# Patient Record
Sex: MALE | Race: WHITE | Employment: FULL TIME | URBAN - METROPOLITAN AREA
[De-identification: names, ages, dates, MRNs, and addresses within clinical notes are randomized per-mention and may not be internally consistent; named-entity substitution may affect disease eponyms.]

---

## 2018-01-18 ENCOUNTER — TRANSFERRED RECORDS (OUTPATIENT)
Dept: HEALTH INFORMATION MANAGEMENT | Facility: CLINIC | Age: 23
End: 2018-01-18

## 2018-01-19 ENCOUNTER — TRANSFERRED RECORDS (OUTPATIENT)
Dept: HEALTH INFORMATION MANAGEMENT | Facility: CLINIC | Age: 23
End: 2018-01-19

## 2019-03-06 ENCOUNTER — OFFICE VISIT (OUTPATIENT)
Dept: FAMILY MEDICINE | Facility: CLINIC | Age: 24
End: 2019-03-06
Payer: COMMERCIAL

## 2019-03-06 VITALS
WEIGHT: 164 LBS | HEIGHT: 75 IN | TEMPERATURE: 97.8 F | BODY MASS INDEX: 20.39 KG/M2 | SYSTOLIC BLOOD PRESSURE: 109 MMHG | RESPIRATION RATE: 18 BRPM | HEART RATE: 69 BPM | DIASTOLIC BLOOD PRESSURE: 71 MMHG | OXYGEN SATURATION: 98 %

## 2019-03-06 DIAGNOSIS — M65.4 DE QUERVAIN'S DISEASE (TENOSYNOVITIS): ICD-10-CM

## 2019-03-06 DIAGNOSIS — Z83.719 FAMILY HISTORY OF COLONIC POLYPS: ICD-10-CM

## 2019-03-06 DIAGNOSIS — Z76.89 ENCOUNTER TO ESTABLISH CARE: Primary | ICD-10-CM

## 2019-03-06 DIAGNOSIS — H83.09 VIRAL LABYRINTHITIS, UNSPECIFIED LATERALITY: ICD-10-CM

## 2019-03-06 DIAGNOSIS — Z11.3 ROUTINE SCREENING FOR STI (SEXUALLY TRANSMITTED INFECTION): ICD-10-CM

## 2019-03-06 ASSESSMENT — MIFFLIN-ST. JEOR: SCORE: 1824.53

## 2019-03-06 NOTE — PATIENT INSTRUCTIONS
Here is the plan from today's visit    1. Viral labyrinthitis, unspecified laterality  This will likely resolve on its own over time    2. De Quervain's disease (tenosynovitis)  Avoid chronic use of the thumb    3. Family history of colonic polyps  - GASTROENTEROLOGY ADULT REF PROCEDURE ONLY    Follow up plan  Follow up with colonoscopy     Thank you for coming to [facility] today.  Lab Testing:  **If you had lab testing today and your results are reassuring or normal they will be mailed to you or sent through [employer] within 7 days.   **If the lab tests need quick action we will call you with the results.  The phone number we will call with results is # [phone] (home) . If this is not the best number please call our clinic and change the number.  Medication Refills:  If you need any refills please call your pharmacy and they will contact us.   If you need to  your refill at a new pharmacy, please contact the new pharmacy directly. The new pharmacy will help you get your medications transferred faster.   Scheduling:  If you have any concerns about today's visit or wish to schedule another appointment please call our office during normal business hours [phone] (8-5:00 M-F)  If a referral was made to a [facility] and you don't get a call from central scheduling please call [phone].  If a Mammogram was ordered for you at [facility] call [phone] to schedule or change your appointment.  If you had an XRay/CT/Ultrasound/MRI ordered the number is [phone] to schedule or change your radiology appointment.   Medical Concerns:  If you have urgent medical concerns please call [phone] at any time of the day.    Shaun Murguia MD

## 2019-03-06 NOTE — PROGRESS NOTES
"      HPI:       Julien Argueta is a 23 year old new patient who presents for the following and to establish care:      Dizziness/Vertigo  Onset:  Off and on for last 3-4 weeks   What brings it on?  Was sitting working on computer and sitting on couch.  Comes on suddenly and resolved over approximately 5 minutes.    Description:   Do you feel like you are going to faint?:  No   Does it feel like the surroundings (bed, room) are moving?:  YES - \"on a ship\"  Have you felt unsteady/off balance?:  YES   Have you passed out or fallen?: No     Intensity: mild    Progression of Symptoms:  same and intermittent    Accompanying Signs & Symptoms:  Heart palpitations:No   Nausea, vomiting:No   Weakness in arms or legs: No   Fatigue: No   Vision or speech changes: No   Ringing in ears (Tinnitus):No   Hearing Loss: No     History:   Head trauma/concussion hx:No   Previous similar symptoms: No   Recent bleeding history: No     Precipitating factors:   Worse with activity or head movement?: No   Any new medications (BP?):No   Alcohol/drug abuse/withdrawal: No     Alleviating factors:   Does staying in a fixed position give relief?:no - resolves on own quickly   Therapies Tried and outcome: Nothing    Joint/Muscle Pain      Onset:  Few weeks ago    Injury?  no    Description:   Location(s):  Right thumb (right handed)  Character: Dull ache -     Intensity: mild    Progression of Symptoms: same    Accompanying Signs & Symptoms:  Other symptoms: no radiation of pain, numbness, tingling, weakness, warmth, swelling, redness, or discoloration    History:   Previous similar pain: no      Worsened by    Overuse?: Yes Details: - computer work  Morning Stiffness?:no    Alleviating factors:  Improved by: rest/inactivity  Therapies Tried and outcome: Nothing    Patient requests STI screening.  No symptoms.  Has unprotected intercourse with one female partner.  Has not been tested since being with this partner.    Patient's father and PGF with " history of early onset premalignant colon polyps.  He wonders how this affects him.  Denies melena or hematochezia.  No fever,chills, or sweats.  No weight loss.    Problem, Medication and Allergy Lists were    There are no active problems to display for this patient.        No current outpatient medications on file.       No Known Allergies.    Patient is   a new patient to this clinic and so  I reviewed/updated the Past Medical History, the Family History and the Social History. ,   Past Medical History:   Diagnosis Date     Murmur     Found as a teen.  Normal Echo   ,   Family History     Problem (# of Occurrences) Relation (Name,Age of Onset)    Atrial fibrillation (1) Maternal Grandmother    Cancer (1) Paternal Grandfather: prostate    Colon Polyps (2) Father: Precancerous, Paternal Grandfather: Precancerous    Heart Disease (1) Maternal Grandfather       and   Social History     Socioeconomic History     Marital status: Single     Spouse name: None     Number of children: None     Years of education: None     Highest education level: None   Occupational History     Occupation:    Social Needs     Financial resource strain: None     Food insecurity:     Worry: None     Inability: None     Transportation needs:     Medical: None     Non-medical: None   Tobacco Use     Smoking status: Never Smoker     Smokeless tobacco: Never Used   Substance and Sexual Activity     Alcohol use: Yes     Comment: one to two drinks per week     Drug use: No     Sexual activity: Yes     Partners: Female     Birth control/protection: IUD   Lifestyle     Physical activity:     Days per week: None     Minutes per session: None     Stress: None   Relationships     Social connections:     Talks on phone: None     Gets together: None     Attends Spiritism service: None     Active member of club or organization: None     Attends meetings of clubs or organizations: None     Relationship status: None     Intimate partner  "violence:     Fear of current or ex partner: None     Emotionally abused: None     Physically abused: None     Forced sexual activity: None   Other Topics Concern     None   Social History Narrative     None   .         Review of Systems:   CONSTITUTIONAL: no fatigue, no unexpected change in weight  SKIN: no worrisome rashes, no worrisome moles, no worrisome lesions  EYES: no acute vision problems or changes  ENT: no ear problems, no mouth problems, no throat problems  RESP: no significant cough, no shortness of breath  CV: no chest pain, no palpitations, no new or worsening peripheral edema  GI: no nausea, no vomiting, no constipation, no diarrhea  : no frequency, no dysuria, no hematuria  MUSCULOSKELETAL: as above   NEURO:  As above   ENDOCRINE: no temperature intolerance, no skin/hair changes  HEME: no easy bruising, no bleeding problems  PSYCHIATRIC: NEGATIVE for changes in mood or affect         Physical Exam:     Vitals:    03/06/19 1554   BP: 109/71   Pulse: 69   Resp: 18   Temp: 97.8  F (36.6  C)   TempSrc: Oral   SpO2: 98%   Weight: 74.4 kg (164 lb)   Height: 1.905 m (6' 3\")     Body mass index is 20.5 kg/m .  Vitals were reviewed and were normal  GENERAL: healthy, alert and no distress  EYES: Eyes grossly normal to inspection, extraocular movements - intact, and PERRL  HENT: ear canals- normal; TMs- normal; Nose- normal; Mouth- no ulcers, no lesions  NECK: no tenderness, no adenopathy, no asymmetry, no masses, no stiffness; thyroid- normal to palpation  RESP: lungs clear to auscultation - no rales, no rhonchi, no wheezes  CV: regular rates and rhythm, normal S1 S2, no S3 or S4 and no murmur, no click or rub -  ABDOMEN: soft, no tenderness, no  hepatosplenomegaly, no masses, normal bowel sounds  MS: extremities- no gross deformities noted, no edema  Right hand  Hand/Finger Exam: Inspection:All Normal  Tender: All Normal  Non-tender: All Normal  Range of Motion All Normal  Strength: full strength  Special " tests: positive Finklestein's  SKIN: no suspicious lesions, no rashes  NEURO: strength and tone- normal, sensory exam- grossly normal, mentation- intact, speech- normal, reflexes- symmetric  BACK: no CVA tenderness, no paralumbar tenderness  PSYCH: Alert and oriented times 3; coherent speech, normal rate and volume, able to articulate logical thoughts, able to abstract reason, no tangential thoughts, no hallucinations or delusions. Affect is normal.  LYMPHATICS: normal ant/post cervical, supraclavicular nodes      Results:     Pending     Assessment and Plan     1. Encounter to establish care  RAYRAY for past records signed.    2. Viral labyrinthitis, unspecified laterality  Explained to patient that for many people this type of dizziness is self-limited over a period of days or weeks.  Antivert may help but does not treat the problem itself.  Pt declines tx at this time.  Canalith repositioning can solve this problem often in one visit.  A referral to physical therapy can be pursued if necessary.  If the dizziness persists a referral to ENT can be pursued.    3. De Quervain's disease (tenosynovitis)  Discussed natural history and expected course of condition.  Follow up if not responding as anticipated.  Consider splinting at that time.    4. Family history of colonic polyps  Referral for colonoscopy for concern for García syndrome/familial polyposis  - GASTROENTEROLOGY ADULT REF PROCEDURE ONLY    5. Routine screening for STI (sexually transmitted infection)  Follow up based on results.  Will mail if negative; call if positive   - HIV Antigen Antibody Combo  - Treponema Abs w Reflex to RPR and Titer  - NEISSERIA GONORRHOEA PCR  - CHLAMYDIA TRACHOMATIS PCR    Options for treatment and follow-up care were reviewed with the patient. Julien Argueta  engaged in the decision making process and verbalized understanding of the options discussed and agreed with the final plan.    Shaun Murguia MD

## 2019-03-06 NOTE — LETTER
March 7, 2019    Julien Argueta  [address] [facility] [address]    Dear Julien,    Thank you for getting your care at [facility]. Please see below for your test results.    Resulted Orders   HIV Antigen Antibody Combo   Result Value Ref Range    HIV Antigen Antibody Combo Nonreactive NR^Nonreactive          Comment:      HIV-1 p24 Ag & HIV-1/HIV-2 Ab Not Detected   Treponema Abs w Reflex to RPR and Titer   Result Value Ref Range    Treponema Antibodies Nonreactive NR^Nonreactive   NEISSERIA GONORRHOEA PCR   Result Value Ref Range    Specimen Descrip Urine     N Gonorrhea PCR Negative NEG^Negative      Comment:      Negative for N. gonorrhoeae rRNA by transcription mediated amplification.  A negative result by transcription mediated amplification does not preclude   the presence of N. gonorrhoeae infection because results are dependent on   proper and adequate collection, absence of inhibitors, and sufficient rRNA to   be detected.     CHLAMYDIA TRACHOMATIS PCR   Result Value Ref Range    Specimen Description Urine     Chlamydia Trachomatis PCR Negative NEG^Negative      Comment:      Negative for C. trachomatis rRNA by transcription mediated amplification.  A negative result by transcription mediated amplification does not preclude   the presence of C. trachomatis infection because results are dependent on   proper and adequate collection, absence of inhibitors, and sufficient rRNA to   be detected.         Your HIV, syphilis, gonorrhea, and chlamydia tests are all negative for infection.    Sincerely,    Shaun Murguia MD

## 2019-03-07 LAB
C TRACH DNA SPEC QL NAA+PROBE: NEGATIVE
HIV 1+2 AB+HIV1 P24 AG SERPL QL IA: NONREACTIVE
N GONORRHOEA DNA SPEC QL NAA+PROBE: NEGATIVE
SPECIMEN SOURCE: NORMAL
SPECIMEN SOURCE: NORMAL
T PALLIDUM AB SER QL: NONREACTIVE

## 2019-04-25 ENCOUNTER — TELEPHONE (OUTPATIENT)
Dept: GASTROENTEROLOGY | Facility: CLINIC | Age: 24
End: 2019-04-25

## 2019-04-25 DIAGNOSIS — Z12.11 SPECIAL SCREENING FOR MALIGNANT NEOPLASMS, COLON: Primary | ICD-10-CM

## 2019-04-25 NOTE — TELEPHONE ENCOUNTER
VM with request pt contact Endoscopy Pre-assessment RN to review upcoming procedure information.  Telephone call-back number provided.    Isabela Irwin, RN  [facility]    Additional Information regarding appointment:      Patient scheduled for:   [x] Colonoscopy    Indication for procedure.    [x] family history of colonic polyps    Procedure Provider:  Dr. Perez      Referring Provider. Shaun Murguia    Arrival time verified: 1400    Facility location verified:   [][address], [address]st Floor, [address]  [x][address] [facility], 5th floor

## 2019-04-25 NOTE — TELEPHONE ENCOUNTER
Patient call back    Arrival time verified? 5/01/19 @1400    Facility location verified? [address]; 5th floor    Instructions given regarding prep and procedure    Prep Type Golytely    Are you taking any anticoagulants or blood thinners? no    Instructions given?     Electronic implanted devices? no    Pre procedure teaching completed? Yes    Transportation from procedure? Yes, verbalized understanding of expectations of designated  and  6 hours after procedure    H&P / Pre op physical completed? n/a

## 2019-04-30 ENCOUNTER — TELEPHONE (OUTPATIENT)
Dept: GASTROENTEROLOGY | Facility: CLINIC | Age: 24
End: 2019-04-30

## 2019-04-30 NOTE — TELEPHONE ENCOUNTER
Call placed to patient to alert him there is availability to come in earlier for scheduled colonoscopy on Wedesday May 1st with Dr. Perez. Patient declined offer to come in an hour earlier  Isabela Irwin

## 2019-05-01 ENCOUNTER — HOSPITAL ENCOUNTER (OUTPATIENT)
Facility: AMBULATORY SURGERY CENTER | Age: 24
End: 2019-05-01
Attending: INTERNAL MEDICINE
Payer: COMMERCIAL

## 2019-05-01 VITALS
WEIGHT: 170 LBS | SYSTOLIC BLOOD PRESSURE: 112 MMHG | HEIGHT: 76 IN | BODY MASS INDEX: 20.7 KG/M2 | RESPIRATION RATE: 16 BRPM | TEMPERATURE: 97.8 F | OXYGEN SATURATION: 100 % | HEART RATE: 51 BPM | DIASTOLIC BLOOD PRESSURE: 66 MMHG

## 2019-05-01 LAB — COLONOSCOPY: NORMAL

## 2019-05-01 RX ORDER — LIDOCAINE 40 MG/G
CREAM TOPICAL
Status: DISCONTINUED | OUTPATIENT
Start: 2019-05-01 | End: 2019-05-02 | Stop reason: HOSPADM

## 2019-05-01 RX ORDER — SIMETHICONE
LIQUID (ML) MISCELLANEOUS PRN
Status: DISCONTINUED | OUTPATIENT
Start: 2019-05-01 | End: 2019-05-01 | Stop reason: HOSPADM

## 2019-05-01 RX ORDER — ONDANSETRON 2 MG/ML
4 INJECTION INTRAMUSCULAR; INTRAVENOUS
Status: DISCONTINUED | OUTPATIENT
Start: 2019-05-01 | End: 2019-05-02 | Stop reason: HOSPADM

## 2019-05-01 RX ORDER — FENTANYL CITRATE 50 UG/ML
INJECTION, SOLUTION INTRAMUSCULAR; INTRAVENOUS PRN
Status: DISCONTINUED | OUTPATIENT
Start: 2019-05-01 | End: 2019-05-01 | Stop reason: HOSPADM

## 2019-05-01 ASSESSMENT — MIFFLIN-ST. JEOR: SCORE: 1867.61

## 2019-05-01 NOTE — DISCHARGE INSTRUCTIONS
Discharge Instructions after Colonoscopy  or Sigmoidoscopy    Today you had a __x__ Colonoscopy ____ Sigmoidoscopy    Activity and Diet  You were given medicine for pain. You may be dizzy or sleepy.  For 24 hours:    Do not drive or use heavy equipment.    Do not make important decisions.    Do not drink any alcohol.  You may return to your normal diet and medicines.    Discomfort    Air was placed in your colon during the exam in order to see it. Walking helps to pass the air.    You may take Tylenol (acetaminophen) for pain unless your doctor has told you not to.  Do not take aspirin or ibuprofen (Advil, Motrin, or other anti-inflammatory  drugs) for _____ days.    Follow-up  __x__ We took small tissue samples or polyps to study. Your doctor will call you with the results  within two weeks.    When to call:    Call right away if you have:    Unusual pain in belly or chest pain not relieved with passing air.    More than 1 to 2 Tablespoons of bleeding from your rectum.    Fever above 100.6  F (37.5  C).    If you have severe pain, bleeding, or shortness of breath, go to an emergency room.    If you have questions, call:  Monday to Friday, 7 a.m. to 4:30 p.m.  Endoscopy: [phone] (We may have to call you back)    After hours  Hospital: [phone] (Ask for the GI fellow on call)

## 2019-05-03 LAB — COPATH REPORT: NORMAL

## 2019-05-17 ENCOUNTER — OFFICE VISIT (OUTPATIENT)
Dept: FAMILY MEDICINE | Facility: CLINIC | Age: 24
End: 2019-05-17
Payer: COMMERCIAL

## 2019-05-17 VITALS
TEMPERATURE: 97.4 F | HEIGHT: 75 IN | HEART RATE: 86 BPM | WEIGHT: 160 LBS | RESPIRATION RATE: 20 BRPM | SYSTOLIC BLOOD PRESSURE: 106 MMHG | DIASTOLIC BLOOD PRESSURE: 73 MMHG | BODY MASS INDEX: 19.89 KG/M2 | OXYGEN SATURATION: 96 %
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
## 2019-05-17 ASSESSMENT — MIFFLIN-ST. JEOR: SCORE: 1806.39

## 2019-05-17 NOTE — PROGRESS NOTES
Preceptor Attestation:   Patient seen, evaluated and discussed with the resident.   I have verified the content of the note, which accurately reflects my assessment of the patient and the plan of care.   Supervising Physician:  Shaun Murguia MD
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
## 2019-12-03 ENCOUNTER — OFFICE VISIT (OUTPATIENT)
Dept: FAMILY MEDICINE | Facility: CLINIC | Age: 24
End: 2019-12-03
Payer: COMMERCIAL

## 2019-12-03 VITALS
DIASTOLIC BLOOD PRESSURE: 81 MMHG | OXYGEN SATURATION: 96 % | HEIGHT: 75 IN | SYSTOLIC BLOOD PRESSURE: 121 MMHG | HEART RATE: 78 BPM | BODY MASS INDEX: 20.76 KG/M2 | WEIGHT: 167 LBS

## 2019-12-03 DIAGNOSIS — M54.50 CHRONIC BILATERAL LOW BACK PAIN WITHOUT SCIATICA: ICD-10-CM

## 2019-12-03 DIAGNOSIS — Z23 ENCOUNTER FOR IMMUNIZATION: Primary | ICD-10-CM

## 2019-12-03 DIAGNOSIS — G89.29 CHRONIC BILATERAL LOW BACK PAIN WITHOUT SCIATICA: ICD-10-CM

## 2019-12-03 ASSESSMENT — ENCOUNTER SYMPTOMS
FATIGUE: 0
NECK PAIN: 0
FEVER: 0
WHEEZING: 0
DIZZINESS: 0
HEMATURIA: 0
SHORTNESS OF BREATH: 0
DIFFICULTY URINATING: 0
UNEXPECTED WEIGHT CHANGE: 0
NUMBNESS: 0
ABDOMINAL PAIN: 0
HEADACHES: 0
BACK PAIN: 1
CHILLS: 0
WEAKNESS: 0
PALPITATIONS: 0
COUGH: 0

## 2019-12-03 ASSESSMENT — MIFFLIN-ST. JEOR: SCORE: 1833.14

## 2019-12-03 NOTE — PROGRESS NOTES
HPI       Julien Argueta is a 24 year old  who presents for   Chief Complaint   Patient presents with     Pain     back x2 weeks getting worse     Has been happening for years. Noticed it getting worse this past month after taking a long trip that involved lots of walking. Mid-low back pain bilaterally.     Back Pain      Duration: daily        Specific cause: none, maybe sitting at work    Description:   Location of pain: mid-low back, on either side of spine  Character of pain: dull ache  Pain radiation:none  New numbness or weakness in legs, not attributed to pain:  No   Any new bowel or bladder incontinence?No     Intensity: Currently 2/10, At its worst 5/10    History:   Pain interferes with job/home/school: No   History of back problems: no prior back problems-was told he had scoliosis as a kid  Any previous MRI or X-rays: None  Sees a specialist for back pain:  No  Therapies tried without relief: tylenol, stretching provide mild relief    Alleviating factors:   Improved by: acetaminophen (Tylenol) and stretch      Precipitating factors:  Worsened by: walking, sleeping (worse when he wakes up)    Accompanying Signs & Symptoms:  Risk of Fracture: No   Risk of Cauda Equina:No   Risk of Infection:  No   Risk of Cancer:  No     +++++++    Problem, Medication and Allergy Lists were reviewed and updated if needed..    Patient is an established patient of this clinic..         Review of Systems:   Review of Systems   Constitutional: Negative for chills, fatigue, fever and unexpected weight change.   Respiratory: Negative for cough, shortness of breath and wheezing.    Cardiovascular: Negative for chest pain, palpitations and leg swelling.   Gastrointestinal: Negative for abdominal pain.   Genitourinary: Negative for difficulty urinating and hematuria.   Musculoskeletal: Positive for back pain. Negative for gait problem and neck pain.   Neurological: Negative for dizziness, weakness, numbness and headaches.  "         Physical Exam:     Vitals:    12/03/19 0809   BP: 121/81   Pulse: 78   SpO2: 96%   Weight: 75.8 kg (167 lb)   Height: 1.905 m (6' 3\")     Body mass index is 20.87 kg/m .  Vitals were reviewed and were normal     Physical Exam  Constitutional:       General: He is not in acute distress.     Appearance: Normal appearance. He is normal weight.   HENT:      Head: Normocephalic and atraumatic.   Eyes:      Conjunctiva/sclera: Conjunctivae normal.   Neck:      Musculoskeletal: Normal range of motion and neck supple. No muscular tenderness.   Pulmonary:      Effort: Pulmonary effort is normal.   Musculoskeletal: Normal range of motion.   Skin:     General: Skin is warm and dry.      Findings: No bruising, erythema or rash.   Neurological:      General: No focal deficit present.      Mental Status: He is alert.      Sensory: No sensory deficit.      Motor: No weakness.   Psychiatric:         Mood and Affect: Mood normal.        Back Exam     Tenderness   The patient is experiencing tenderness in the lumbar and thoracic (paraspinal muscle tenderness; very tight thoracic and lumbar paraspinal muscles).    Range of Motion   Extension: normal   Flexion: normal   Lateral bend right: normal   Lateral bend left: normal   Rotation right: normal   Rotation left: normal     Muscle Strength   The patient has normal back strength.    Tests   Straight leg raise right: negative  Straight leg raise left: negative    Other   Sensation: normal  Gait: normal   Erythema: no back redness  Scars: absent    Comments:  Normal one legged squat.            Results:   No testing ordered today    Assessment and Plan        Chronic bilateral low back pain without sciatica  Patient presents with chronic back pain, musculoskeletal in nature, likely related to sitting at work. No vertebral tenderness or injury to suggest fracture, will not obtain imaging today. Recommend tylenol and/or naproxen, heat/ice, and epson salt baths for pain. Provided " patient with daily stretches to do every morning. Recommend future OMT visit. Advised to return in 2-4wks if no relief or worsening pain-can discuss routine OMT visits or PT at that time.    Encounter for immunization  - ADMIN VACCINE, INITIAL  - TDAP VACCINE (BOOSTRIX)       There are no discontinued medications.    Options for treatment and follow-up care were reviewed with the patient. Julien Argueta  engaged in the decision making process and verbalized understanding of the options discussed and agreed with the final plan.    Jaden Husain, DO

## 2019-12-03 NOTE — PROGRESS NOTES
Preceptor Attestation:   Patient seen, evaluated and discussed with the resident.   I have verified the content of the note, which accurately reflects my assessment of the patient and the plan of care.   Supervising Physician:  Shaun Murguia MD.

## 2019-12-03 NOTE — PATIENT INSTRUCTIONS
Back pain:  -take tylenol and/or naproxen for pain as needed  -use hot packs or cold packs  -do stretches every morning for 5 minutes  -schedule a visit for OMT  -if no relief in 2-4wks, return for a follow up visit. Can discuss physical therapy at this time.      Patient Education     Lumbar Flexion (Flexibility)    1. Lie on your back on the floor, with your knees bent and your feet flat on the floor.  2. Gently pull your knees up toward your chest. Put your hands under your thighs to help pull your knees up.  3. Press your lower back down to the floor. Hold for 20 seconds.  4. Lower your legs back down to the floor and relax.  5. Repeat 2 times, or as instructed.  Date Last Reviewed: 3/10/2016    0456-4500 [employer]. [address] [place] [address]. All rights reserved. This information is not intended as a substitute for professional medical care. Always follow your healthcare professional's instructions.           Patient Education     Lumbar Stretch (Flexibility)    1. Lie on your back on the floor, with your knees bent and your feet flat on the floor. Don t press your neck or lower back to the floor.  2. Pull one knee up toward your chest. Clasp your hands under your thigh to help pull.  3. Hold for 30 to 60 seconds. Lower your leg back down to the floor.  4. Repeat 2 times, or as instructed.  5. Switch legs and repeat.  Date Last Reviewed: 3/10/2016    4934-0147 [employer]. [address] [place] [address]. All rights reserved. This information is not intended as a substitute for professional medical care. Always follow your healthcare professional's instructions.           Patient Education     Lumbar Extension (Flexibility)    1. Lie face down on your stomach, forehead on the floor. You can lie on a mat or towel.  2. Bend your arms next to your body and lift your upper body up on your forearms. Your palms and forearms should be flat on the floor. Keep  your stomach and hips on the floor.  3. Hold your upper body up with your forearms for 20 seconds. Slowly lower back down to the floor.  4. Repeat 2 times, or as instructed.  Date Last Reviewed: 3/10/2016    0629-8843 [employer]. [address] [place] [address]. All rights reserved. This information is not intended as a substitute for professional medical care. Always follow your healthcare professional's instructions.           Patient Education     Back Exercises: Lower Back Stretch    To start, sit in a chair with your feet flat on the floor. Shift your weight slightly forward. Relax, and keep your ears, shoulders, and hips aligned while you do the following:    Sit with your feet well apart.    Bend forward and touch the floor with the backs of your hands. Relax and let your body drop.    Hold for 20 seconds. Return to starting position.    Repeat 2 times.   Date Last Reviewed: 11/1/2017 2000-2018 [employer]. [address] [place] [address]. All rights reserved. This information is not intended as a substitute for professional medical care. Always follow your healthcare professional's instructions.           Patient Education     Back Exercises: Back Release  Do this exercise on your hands and knees. Keep your knees under your hips and your hands under your shoulders.        Relax your abdominal and buttocks muscles, lift your head, and let your back sag. Be sure to keep your weight evenly distributed. Don t sit back on your hips.     Hold for 5 seconds.    Return to starting position.    Tuck your head and lift (arch) your back.    Hold for 5 seconds    Return to starting position.    Repeat 5 times.  Date Last Reviewed: 3/1/2018    3559-8471 [employer]. [address] [place] [address]. All rights reserved. This information is not intended as a substitute for professional medical care. Always follow your healthcare professional's  instructions.           Patient Education     Exercises to Strengthen Your Lower Back  Strong lower back and abdominal muscles work together to support your spine. The exercises below will help strengthen the lower back. It is important that you begin exercising slowly and increase levels gradually.  Always begin any exercise program with stretching. If you feel pain while doing any of these exercises, stop and talk to your doctor about a more specific exercise program that better suits your condition.   Low back stretch  The point of stretching is to make you more flexible and increase your range of motion. Stretch only as much as you are able. Stretch slowly. Do not push your stretch to the limit. If at any point you feel pain while stretching, this is your (temporary) limit.    Lie on your back with your knees bent and both feet on the ground.    Slowly raise your left knee to your chest as you flatten your lower back against the floor. Hold for 5 seconds.    Relax and repeat the exercise with your right knee.    Do 10 of these exercises for each leg.    Repeat hugging both knees to your chest at the same time.  Building lower back strength  Start your exercise routine with 10 to 30 minutes a day, 1 to 3 times a day.  Initial exercises  Lying on your back:  1. Ankle pumps: Move your foot up and down, towards your head, and then away. Repeat 10 times with each foot.  2. Heel slides: Slowly bend your knee, drawing the heel of your foot towards you. Then slide your heel/foot from you, straightening your knee. Do not lift your foot off the floor (this is not a leg lift).  3. Abdominal contraction: Bend your knees and put your hands on your stomach. Tighten your stomach muscles. Hold for 5 seconds, then relax. Repeat 10 times.  4. Straight leg raise: Bend one leg at the knee and keep the other leg straight. Tighten your stomach muscles. Slowly lift your straight leg 6 to 12 inches off the floor and hold for up to 5  seconds. Repeat 10 times on each side.  Standin. Wall squats: Stand with your back against the wall. Move your feet about 12 inches away from the wall. Tighten your stomach muscles, and slowly bend your knees until they are at about a 45 degree angle. Do not go down too far. Hold about 5 seconds. Then slowly return to your starting position. Repeat 10 times.  2. Heel raises: Stand facing the wall. Slowly raise the heels of your feet up and down, while keeping your toes on the floor. If you have trouble balancing, you can touch the wall with your hands. Repeat 10 times.  More advanced exercises  When you feel comfortable enough, try these exercises.  1. Kneeling lumbar extension: Begin on your hands and knees. At the same time, raise and straighten your right arm and left leg until they are parallel to the ground. Hold for 2 seconds and come back slowly to a starting position. Repeat with left arm and right leg, alternating 10 times.  2. Prone lumbar extension: Lie face down, arms extended overhead, palms on the floor. At the same time, raise your right arm and left leg as high as comfortably possible. Hold for 10 seconds and slowly return to start. Repeat with left arm and right leg, alternating 10 times. Gradually build up to 20 times. (Advanced: Repeat this exercise raising both arms and both legs a few inches off the floor at the same time. Hold for 5 seconds and release.)  3. Pelvic tilt: Lie on the floor on your back with your knees bent at 90 degrees. Your feet should be flat on the floor. Inhale, exhale, then slowly contract your abdominal muscles bringing your navel toward your spine. Let your pelvis rock back until your lower back is flat on the floor. Hold for 10 seconds while breathing smoothly.  4. Abdominal crunch: Perform a pelvic tilt (above) flattening your lower back against the floor. Holding the tension in your abdominal muscles, take another breath and raise your shoulder blades off the  ground (this is not a full sit-up). Keep your head in line with your body (don t bend your neck forward). Hold for 2 seconds, then slowly lower.  Date Last Reviewed: 6/1/2016 2000-2018 [employer]. [address] [place] [address], [place] [address]. All rights reserved. This information is not intended as a substitute for professional medical care. Always follow your healthcare professional's instructions.

## 2020-03-11 ENCOUNTER — HEALTH MAINTENANCE LETTER (OUTPATIENT)
Age: 25
End: 2020-03-11

## 2020-11-19 ENCOUNTER — TELEPHONE (OUTPATIENT)
Dept: FAMILY MEDICINE | Facility: CLINIC | Age: 25
End: 2020-11-19

## 2020-11-19 NOTE — TELEPHONE ENCOUNTER
Left a message to return call.  please transfer to any available RN when patient calls back, thank you!    Charu Denis RN

## 2020-11-19 NOTE — TELEPHONE ENCOUNTER
[facility] phone call message-patient reporting a symptom:     Symptom: Wart    When did symptoms begin? Noticed a few days ago    Characteristics: (location on body, intensity, what makes it better or worse, associated symptoms):      Additional Details: Patient noticed a wart on their foot and they would like to know if they are contagious, and if so, how contagious since they are sharing their living space with another person. Please advise.       Same Day Visit Offered: N/A    Additional comments: Requested call back from RN    OK to leave message on voice mail? Yes    Advised patient that RN would call back within 3 hours, unless emergent   Primary language: English      needed? No    Call taken on November 19, 2020 at 2:22 PM by Xiao Jimenez

## 2020-11-20 NOTE — TELEPHONE ENCOUNTER
Spoke with patient who stated that he has 2 warts that he is treating but wanted to know if they are contagious i.e. should he be sharing a shower with someone else. Advised that the common wart is spread via skin-to-skin contact. He verbalized understanding and had no further questions.    Charu Denis RN

## 2021-01-03 ENCOUNTER — HEALTH MAINTENANCE LETTER (OUTPATIENT)
Age: 26
End: 2021-01-03

## 2021-03-11 ENCOUNTER — OFFICE VISIT (OUTPATIENT)
Dept: FAMILY MEDICINE | Facility: CLINIC | Age: 26
End: 2021-03-11
Payer: COMMERCIAL

## 2021-03-11 VITALS
HEART RATE: 89 BPM | RESPIRATION RATE: 16 BRPM | OXYGEN SATURATION: 95 % | SYSTOLIC BLOOD PRESSURE: 135 MMHG | HEIGHT: 76 IN | DIASTOLIC BLOOD PRESSURE: 78 MMHG | BODY MASS INDEX: 20.58 KG/M2 | WEIGHT: 169 LBS | TEMPERATURE: 98.7 F

## 2021-03-11 DIAGNOSIS — Z86.19 H/O COLD SORES: ICD-10-CM

## 2021-03-11 DIAGNOSIS — G89.29 CHRONIC BILATERAL LOW BACK PAIN WITHOUT SCIATICA: ICD-10-CM

## 2021-03-11 DIAGNOSIS — Z00.00 ROUTINE GENERAL MEDICAL EXAMINATION AT A HEALTH CARE FACILITY: Primary | ICD-10-CM

## 2021-03-11 DIAGNOSIS — Z86.19 H/O TINEA CRURIS: ICD-10-CM

## 2021-03-11 DIAGNOSIS — M54.50 CHRONIC BILATERAL LOW BACK PAIN WITHOUT SCIATICA: ICD-10-CM

## 2021-03-11 PROCEDURE — 36415 COLL VENOUS BLD VENIPUNCTURE: CPT | Performed by: STUDENT IN AN ORGANIZED HEALTH CARE EDUCATION/TRAINING PROGRAM

## 2021-03-11 PROCEDURE — 86803 HEPATITIS C AB TEST: CPT | Performed by: STUDENT IN AN ORGANIZED HEALTH CARE EDUCATION/TRAINING PROGRAM

## 2021-03-11 PROCEDURE — 99395 PREV VISIT EST AGE 18-39: CPT | Mod: GC | Performed by: STUDENT IN AN ORGANIZED HEALTH CARE EDUCATION/TRAINING PROGRAM

## 2021-03-11 RX ORDER — VALACYCLOVIR HYDROCHLORIDE 500 MG/1
500 TABLET, FILM COATED ORAL 2 TIMES DAILY PRN
Qty: 30 TABLET | Refills: 3 | Status: SHIPPED | OUTPATIENT
Start: 2021-03-11

## 2021-03-11 RX ORDER — CLOTRIMAZOLE AND BETAMETHASONE DIPROPIONATE 10; .64 MG/G; MG/G
CREAM TOPICAL 2 TIMES DAILY
Qty: 45 G | Refills: 0 | Status: SHIPPED | OUTPATIENT
Start: 2021-03-11

## 2021-03-11 SDOH — ECONOMIC STABILITY: TRANSPORTATION INSECURITY
IN THE PAST 12 MONTHS, HAS LACK OF TRANSPORTATION KEPT YOU FROM MEETINGS, WORK, OR FROM GETTING THINGS NEEDED FOR DAILY LIVING?: NOT ASKED

## 2021-03-11 SDOH — ECONOMIC STABILITY: FOOD INSECURITY: WITHIN THE PAST 12 MONTHS, THE FOOD YOU BOUGHT JUST DIDN'T LAST AND YOU DIDN'T HAVE MONEY TO GET MORE.: NOT ASKED

## 2021-03-11 SDOH — HEALTH STABILITY: MENTAL HEALTH: HOW MANY STANDARD DRINKS CONTAINING ALCOHOL DO YOU HAVE ON A TYPICAL DAY?: NOT ASKED

## 2021-03-11 SDOH — ECONOMIC STABILITY: FOOD INSECURITY: WITHIN THE PAST 12 MONTHS, YOU WORRIED THAT YOUR FOOD WOULD RUN OUT BEFORE YOU GOT MONEY TO BUY MORE.: NOT ASKED

## 2021-03-11 SDOH — ECONOMIC STABILITY: TRANSPORTATION INSECURITY
IN THE PAST 12 MONTHS, HAS THE LACK OF TRANSPORTATION KEPT YOU FROM MEDICAL APPOINTMENTS OR FROM GETTING MEDICATIONS?: NOT ASKED

## 2021-03-11 SDOH — HEALTH STABILITY: MENTAL HEALTH: HOW OFTEN DO YOU HAVE 6 OR MORE DRINKS ON ONE OCCASION?: NOT ASKED

## 2021-03-11 SDOH — ECONOMIC STABILITY: INCOME INSECURITY: HOW HARD IS IT FOR YOU TO PAY FOR THE VERY BASICS LIKE FOOD, HOUSING, MEDICAL CARE, AND HEATING?: NOT ASKED

## 2021-03-11 SDOH — HEALTH STABILITY: MENTAL HEALTH: HOW OFTEN DO YOU HAVE A DRINK CONTAINING ALCOHOL?: 2-3 TIMES A WEEK

## 2021-03-11 ASSESSMENT — MIFFLIN-ST. JEOR: SCORE: 1853.08

## 2021-03-11 NOTE — PROGRESS NOTES
Male Physical Note          HPI       Concerns today:   -requesting valtrex for cold sores (gets about every month)  -back pain. Manageable, doing stretches (rides bike), worse with sitting, better from 12/2019 visit, naproxen at bedtime sometimes, located over bilateral lumbar paraspinal muscles, no numbness/tingling or weakness      PMH: none  Meds:  -valtrex prn  -clomitrazole cream prn for tinea cruris  PSH: colonoscopy 2019 (dad mid 40s, & grandfather had lots of polyps removed)  PFH:   -dad-lots of polyps (no colon cancer)  -mother-multiple myeloma (dx 2019), thyroid out  -maternal grandfather-passed from MI    Social:  -how spend days-working from home-[employer], ride bikes, draws/read  -tobacco-none, alcohol-yes 3/4 drinks, drugs-none  -sexually active-GF  -diet-healthy diet  -exercise-daily bike riding    Preventative:  -HPV-had gardisil age 15   -flu-last was oct 2020  -hep c screening-agrees to    Patient Active Problem List   Diagnosis     Chronic bilateral low back pain without sciatica       Past Medical History:   Diagnosis Date     Chronic low back pain      Murmur     Found as a teen.  Normal Echo       Previous Medical Care      Family History   Problem Relation Age of Onset     Other - See Comments Mother      Multiple myeloma Mother      Colon Polyps Father         Precancerous     Atrial fibrillation Maternal Grandmother      Heart Disease Maternal Grandfather      Myocardial Infarction Maternal Grandfather      Cancer Paternal Grandfather         prostate     Colon Polyps Paternal Grandfather         Precancerous              Review of Systems:     Review of Systems:  CONSTITUTIONAL: NEGATIVE for fever, chills, change in weight  INTEGUMENTARY/SKIN: NEGATIVE for worrisome rashes, moles or lesions  EYES: NEGATIVE for vision changes or irritation  ENT/MOUTH: NEGATIVE for ear, mouth and throat problems  RESP: NEGATIVE for significant cough or SOB  BREAST: NEGATIVE for masses,  tenderness or discharge  CV: NEGATIVE for chest pain, palpitations or peripheral edema  GI: NEGATIVE for nausea, abdominal pain, heartburn, or change in bowel habits  : NEGATIVE for frequency, dysuria, or hematuria  MUSCULOSKELETAL: NEGATIVE for significant arthralgias or myalgia  NEURO: NEGATIVE for weakness, dizziness or paresthesias  ENDOCRINE: NEGATIVE for temperature intolerance, skin/hair changes  HEME/ALLERGY: NEGATIVE for bleeding problems  PSYCHIATRIC: NEGATIVE for changes in mood or affect    Sleep:   Do you snore most or the night (as reported by a family member)? No  Do you feel sleepy or extremely tired during most of the day? No           Social History     Social History     Socioeconomic History     Marital status: Single     Spouse name: Not on file     Number of children: Not on file     Years of education: Not on file     Highest education level: Not on file   Occupational History     Occupation:      Occupation: [employer]   Social Needs     Financial resource strain: Not on file     Food insecurity     Worry: Not on file     Inability: Not on file     Transportation needs     Medical: Not on file     Non-medical: Not on file   Tobacco Use     Smoking status: Never Smoker     Smokeless tobacco: Never Used   Substance and Sexual Activity     Alcohol use: Yes     Frequency: 2-3 times a week     Comment: one to two drinks per week     Drug use: No     Sexual activity: Yes     Partners: Female     Birth control/protection: I.U.D.   Lifestyle     Physical activity     Days per week: Not on file     Minutes per session: Not on file     Stress: Not on file   Relationships     Social connections     Talks on phone: Not on file     Gets together: Not on file     Attends Worship service: Not on file     Active member of club or organization: Not on file     Attends meetings of clubs or organizations: Not on file     Relationship status: Not on file     Intimate partner  "violence     Fear of current or ex partner: Not on file     Emotionally abused: Not on file     Physically abused: Not on file     Forced sexual activity: Not on file   Other Topics Concern     Not on file   Social History Narrative     Not on file       Marital Status:Single  Who lives in your household? single    Has anyone hurt you physically, for example by pushing, hitting, slapping or kicking you or forcing you to have sex? Denies  Do you feel threatened or controlled by a partner, ex-partner or anyone in your life? Denies    Sexual Health     Sexual concerns: No   STI History: Neg      Recommended Screening   n/a         Physical Exam:     Vitals: /78   Pulse 89   Temp 98.7  F (37.1  C) (Oral)   Resp 16   Ht 1.93 m (6' 4\")   Wt 76.7 kg (169 lb)   SpO2 95%   BMI 20.57 kg/m    BMI= Body mass index is 20.57 kg/m .     GENERAL: healthy, alert and no distress  EYES: Eyes grossly normal to inspection, extraocular movements - intact, and PERRL  HENT: ear canals- normal; TMs- normal; Nose- normal; Mouth- no ulcers, no lesions  NECK: no tenderness, no adenopathy, no asymmetry, no masses, no stiffness; thyroid- normal to palpation  RESP: lungs clear to auscultation - no rales, no rhonchi, no wheezes  CV: regular rates and rhythm, normal S1 S2, no S3 or S4 and no murmur, no click or rub -  ABDOMEN: soft, no tenderness, no  hepatosplenomegaly, no masses, normal bowel sounds  MS: extremities- no gross deformities noted, no edema  SKIN: no suspicious lesions, no rashes  NEURO: strength and tone- normal, sensory exam- grossly normal, mentation- intact, speech- normal, reflexes- symmetric  BACK: no CVA tenderness, no paralumbar tenderness  PSYCH: Alert and oriented times 3; speech- coherent , normal rate and volume; able to articulate logical thoughts, able to abstract reason, no tangential thoughts, no hallucinations or delusions, affect- normal  LYMPHATICS: ant. cervical- normal, post. cervical- normal, " axillary- normal, supraclavicular- normal, inguinal- normal    Assessment and Plan     Julien was seen today for physical and refill request.    Diagnoses and all orders for this visit:    Routine general medical examination at a health care facility  Healthy normal weight. Doing well. Will get hep c screening today. Up to date on vaccines. Follow up in 1-2 years.  -     Hepatitis C antibody    Chronic bilateral low back pain without sciatica  -     [facility]; Future    H/O cold sores  Refilled valtrex for monthly cold sores. Takes only during an outbreak. No genital sores.  -     valACYclovir (VALTREX) 500 MG tablet; Take 1 tablet (500 mg) by mouth 2 times daily as needed    H/O tinea cruris  No current rash. Refilled per pt request. Advised to mychart or make visit if rash returns.  -     clotrimazole-betamethasone (LOTRISONE) 1-0.05 % external cream; Apply topically 2 times daily      Options for treatment and follow-up care were reviewed with the patient. Julien Argueta and/or guardian engaged in the decision making process and verbalized understanding of the options discussed and agreed with the final plan.    Jaden Husain DO

## 2021-03-11 NOTE — PROGRESS NOTES
Preceptor Attestation:  Patient seen and evaluated in person. I discussed the patient with the resident. I have verified the content of the note, which accurately reflects my assessment of the patient and the plan of care.   Supervising Physician:  Isabelle Felton DO

## 2021-03-12 LAB — HCV AB SERPL QL IA: NONREACTIVE

## 2021-03-22 ENCOUNTER — THERAPY VISIT (OUTPATIENT)
Dept: PHYSICAL THERAPY | Facility: CLINIC | Age: 26
End: 2021-03-22
Payer: COMMERCIAL

## 2021-03-22 DIAGNOSIS — M54.50 CHRONIC BILATERAL LOW BACK PAIN WITHOUT SCIATICA: ICD-10-CM

## 2021-03-22 DIAGNOSIS — G89.29 CHRONIC BILATERAL LOW BACK PAIN WITHOUT SCIATICA: ICD-10-CM

## 2021-03-22 PROCEDURE — 97161 PT EVAL LOW COMPLEX 20 MIN: CPT | Mod: GP | Performed by: PHYSICAL THERAPIST

## 2021-03-22 PROCEDURE — 97110 THERAPEUTIC EXERCISES: CPT | Mod: GP | Performed by: PHYSICAL THERAPIST

## 2021-03-22 PROCEDURE — 97530 THERAPEUTIC ACTIVITIES: CPT | Mod: GP | Performed by: PHYSICAL THERAPIST

## 2021-03-22 NOTE — PROGRESS NOTES
Physical Therapy Initial Evaluation  Subjective:  The history is provided by the patient.   Patient Health History  Julien Argueta being seen for low back pain.     Date of Onset: Chronic 2 years. MD order 3/11/21.   Problem occurred: unknown   Pain score: 2/10 today. 5-6/10 at worst.  General health as reported by patient is good.                                       Therapist Generated HPI Evaluation  Problem details: MD order 3/11/21  Pt reports chronic bilateral low back pain the past 2 years, insidious onset. Pain across both sides of low back. Worse with sitting.  Has tried some stretching which somewhat help but not a lot. Has noticed it more lately since he is sitting more with working from home. Also notices waking up in the AM, it can wake him up in the AM at times. Enjoys biking which is fine. Walking can help..         Type of problem:  Lumbar.    This is a chronic condition.  Condition occurred with:  Insidious onset.  Where condition occurred: for unknown reasons.  Patient reports pain:  Lumbar spine left, lumbar spine right, lower lumbar spine and mid lumbar spine.  Pain quality: tightness, ache. and is intermittent.  Pain radiates to:  No radiation. Pain is worse in the A.M. (and afternoon).  Since onset symptoms are gradually worsening (d/t increased sitting).  Associated symptoms:  Loss of motion/stiffness. Symptoms are exacerbated by sitting  and relieved by activity/movement and other (kneeling for work instead of sitting, some stretching).  Imaging testing: none.  Past treatment: none. Improved with treatment: NA.                          Objective:  System         Lumbar/SI Evaluation  ROM:    AROM Lumbar:   Flexion:          To ankles - stretching  Ext:                    80% - slight incr pain   Side Bend:        Left:  WNL    Right:  WNL  Rotation:           Left:     Right:   Side Glide:        Left:     Right:         Strength: hip extension 5/5 bilat, painfree            Lumbar  Palpation:      Tenderness not present at Left:    Erector Spinae    Tenderness not present at Right:  Erector Spinae    Lumbar Provocation:  Lumbar provocation: mild pain with PA spring testing at mid-lumbar spine, painfree at upper and lower lumbar, mild hypomobility.                                                       Carlo Lumbar Evaluation    Posture:  Sitting: fair              Test Movements:        EIL: During: increases  After: no effect    Repeat EIL: During: decreases  After: better  Mechanical Response: IncROM        Conclusion: derangement  Principle of Treatment:  Posture Correction: lumbar roll, breaking up postures    Extension: REIL lock/sag                                           ROS    Assessment/Plan:    Patient is a 25 year old male with lumbar complaints.    Patient has the following significant findings with corresponding treatment plan.                Diagnosis 1:  Bilateral low back pain    Pain -  manual therapy, self management, education, directional preference exercise and home program  Decreased ROM/flexibility - manual therapy, therapeutic exercise and home program  Decreased joint mobility - manual therapy, therapeutic exercise and home program  Decreased function - therapeutic activities and home program  Impaired posture - neuro re-education, therapeutic activities and home program    Therapy Evaluation Codes:   1) History comprised of:   Personal factors that impact the plan of care:      None.    Comorbidity factors that impact the plan of care are:      None.     Medications impacting care: None.  2) Examination of Body Systems comprised of:   Body structures and functions that impact the plan of care:      Lumbar spine.   Activity limitations that impact the plan of care are:      Sitting.  3) Clinical presentation characteristics are:   Stable/Uncomplicated.  4) Decision-Making    Low complexity using standardized patient assessment instrument and/or measureable  assessment of functional outcome.  Cumulative Therapy Evaluation is: Low complexity.    Previous and current functional limitations:  (See Goal Flow Sheet for this information)    Short term and Long term goals: (See Goal Flow Sheet for this information)     Communication ability:  Patient appears to be able to clearly communicate and understand verbal and written communication and follow directions correctly.  Treatment Explanation - The following has been discussed with the patient:   RX ordered/plan of care  Anticipated outcomes  Possible risks and side effects  This patient would benefit from PT intervention to resume normal activities.   Rehab potential is good.    Frequency:  2 X a month, once daily  Duration:  for 2 months  Discharge Plan:  Achieve all LTG.  Independent in home treatment program.  Reach maximal therapeutic benefit.    Please refer to the daily flowsheet for treatment today, total treatment time and time spent performing 1:1 timed codes.

## 2021-03-23 NOTE — PROGRESS NOTES
Physical Therapy Initial Evaluation  Subjective:    Patient Health History         Pain is reported as 2/10 on pain scale.  General health as reported by patient is good.       Medical allergies: none.           Current occupation is .   Primary job tasks include:  Prolonged sitting.                                    Objective:  System    Physical Exam    General     ROS    Assessment/Plan:

## 2021-04-09 ENCOUNTER — THERAPY VISIT (OUTPATIENT)
Dept: PHYSICAL THERAPY | Facility: CLINIC | Age: 26
End: 2021-04-09
Payer: COMMERCIAL

## 2021-04-09 DIAGNOSIS — G89.29 CHRONIC BILATERAL LOW BACK PAIN WITHOUT SCIATICA: ICD-10-CM

## 2021-04-09 DIAGNOSIS — M54.50 CHRONIC BILATERAL LOW BACK PAIN WITHOUT SCIATICA: ICD-10-CM

## 2021-04-09 PROCEDURE — 97140 MANUAL THERAPY 1/> REGIONS: CPT | Mod: GP | Performed by: PHYSICAL THERAPIST

## 2021-04-09 PROCEDURE — 97110 THERAPEUTIC EXERCISES: CPT | Mod: GP | Performed by: PHYSICAL THERAPIST

## 2021-04-09 PROCEDURE — 97530 THERAPEUTIC ACTIVITIES: CPT | Mod: GP | Performed by: PHYSICAL THERAPIST

## 2021-05-18 ENCOUNTER — THERAPY VISIT (OUTPATIENT)
Dept: PHYSICAL THERAPY | Facility: CLINIC | Age: 26
End: 2021-05-18
Payer: COMMERCIAL

## 2021-05-18 DIAGNOSIS — M54.50 CHRONIC BILATERAL LOW BACK PAIN WITHOUT SCIATICA: ICD-10-CM

## 2021-05-18 DIAGNOSIS — G89.29 CHRONIC BILATERAL LOW BACK PAIN WITHOUT SCIATICA: ICD-10-CM

## 2021-05-18 PROCEDURE — 97110 THERAPEUTIC EXERCISES: CPT | Mod: GP | Performed by: PHYSICAL THERAPIST

## 2021-05-18 PROCEDURE — 97530 THERAPEUTIC ACTIVITIES: CPT | Mod: GP | Performed by: PHYSICAL THERAPIST

## 2021-05-18 NOTE — PROGRESS NOTES
PROGRESS  REPORT    Progress reporting period is from 3/22/21 to 5/18/21.       SUBJECTIVE  Subjective: pt reports continue improvement. the most stubborn thing is feeling tight when he wakes up in the AM. does not wake him up, but when he wakes up he can feel it and feel like he needs to move. that is more higher up in his back though. the lower back area is much improved. has to ease into the pressups for the day prior to elevating hands. sitting is improved. can still bother upper area though. swtiching chairs an getting up is helpful. the prone hip extension exercise is painful    Current pain level is 0/10  .      Initial Pain level: 6/10.   Changes in function:  Yes (See Goal flowsheet attached for changes in current functional level)  Adverse reaction to treatment or activity: None    OBJECTIVE  Changes noted in objective findings:  Yes  Objective: discomfort at thoracic spine: thoracic AROM: flexion WNL - discomfort. ext: mod limit -relief. thoracic: WNL bilat/NE. lumbar AROM: flexion to ankles: NE. ext: mod limit - NE. relief after performing rep thor ext over foam roller. mild tightness of piriformis bilat. good HS flexibility bilat. slight decr pelvic control     ASSESSMENT/PLAN  Updated problem list and treatment plan: Diagnosis 1:  low back pain    Pain -  self management, education, directional preference exercise and home program  Decreased ROM/flexibility - manual therapy, therapeutic exercise and home program  Decreased strength - therapeutic exercise, therapeutic activities and home program  Decreased function - therapeutic activities and home program  STG/LTGs have been met or progress has been made towards goals:  Yes (See Goal flow sheet completed today.)  Assessment of Progress: The patient's condition is improving.  Patient is meeting short term goals and is progressing towards long term goals.  Self Management Plans:  Patient has been instructed in a home treatment program.  Patient  has been  instructed in self management of symptoms.  I have re-evaluated this patient and find that the nature, scope, duration and intensity of the therapy is appropriate for the medical condition of the patient.  Walker continues to require the following intervention to meet STG and LTG's:  PT    Recommendations:  This patient would benefit from continued therapy.     Frequency:  1 X a month, once daily  Duration:  for 1 months PRN        Please refer to the daily flowsheet for treatment today, total treatment time and time spent performing 1:1 timed codes.

## 2021-10-10 ENCOUNTER — HEALTH MAINTENANCE LETTER (OUTPATIENT)
Age: 26
End: 2021-10-10

## 2021-12-13 PROBLEM — G89.29 CHRONIC BILATERAL LOW BACK PAIN WITHOUT SCIATICA: Status: RESOLVED | Noted: 2019-12-03 | Resolved: 2021-12-13

## 2021-12-13 PROBLEM — M54.50 CHRONIC BILATERAL LOW BACK PAIN WITHOUT SCIATICA: Status: RESOLVED | Noted: 2019-12-03 | Resolved: 2021-12-13

## 2021-12-13 NOTE — PROGRESS NOTES
Please see Progress Note from the last visit on 5/18/21 for discharge information as patient did not return to Physical therapy after that visit as he was doing well. Patient will be discharged from formal physical therapy at this time.

## 2022-05-21 ENCOUNTER — HEALTH MAINTENANCE LETTER (OUTPATIENT)
Age: 27
End: 2022-05-21

## 2022-09-18 ENCOUNTER — HEALTH MAINTENANCE LETTER (OUTPATIENT)
Age: 27
End: 2022-09-18

## 2023-06-04 ENCOUNTER — HEALTH MAINTENANCE LETTER (OUTPATIENT)
Age: 28
End: 2023-06-04

## (undated) DEVICE — SPECIMEN CONTAINER 3OZ W/FORMALIN 59901

## (undated) DEVICE — SUCTION MANIFOLD NEPTUNE 2 SYS 4 PORT 0702-020-000

## (undated) DEVICE — ENDO CAP AND TUBING STERILE FOR ENDOGATOR  100130

## (undated) DEVICE — ENDO CONNECTOR ENDOGATOR AUX WATER JET FOR OLYMPUS SCOPE

## (undated) DEVICE — ENDO FORCEP ENDOJAW BIOPSY 2.8MMX230CM FB-220U

## (undated) DEVICE — SOL WATER IRRIG 1000ML BOTTLE 2F7114

## (undated) DEVICE — WIPE PREMOIST CLEANSING WASHCLOTHS 7988

## (undated) DEVICE — TAPE DURAPORE 3" SILK 1538-3

## (undated) RX ORDER — FENTANYL CITRATE 50 UG/ML
INJECTION, SOLUTION INTRAMUSCULAR; INTRAVENOUS
Status: DISPENSED
Start: 2019-05-01